# Patient Record
Sex: FEMALE | URBAN - METROPOLITAN AREA
[De-identification: names, ages, dates, MRNs, and addresses within clinical notes are randomized per-mention and may not be internally consistent; named-entity substitution may affect disease eponyms.]

---

## 2018-03-01 ENCOUNTER — APPOINTMENT (RX ONLY)
Dept: URBAN - METROPOLITAN AREA CLINIC 143 | Facility: CLINIC | Age: 58
Setting detail: DERMATOLOGY
End: 2018-03-01

## 2018-03-01 DIAGNOSIS — L29.8 OTHER PRURITUS: ICD-10-CM

## 2018-03-01 DIAGNOSIS — L259 CONTACT DERMATITIS AND OTHER ECZEMA, UNSPECIFIED CAUSE: ICD-10-CM

## 2018-03-01 DIAGNOSIS — L29.89 OTHER PRURITUS: ICD-10-CM

## 2018-03-01 PROBLEM — E78.5 HYPERLIPIDEMIA, UNSPECIFIED: Status: ACTIVE | Noted: 2018-03-01

## 2018-03-01 PROBLEM — L23.9 ALLERGIC CONTACT DERMATITIS, UNSPECIFIED CAUSE: Status: ACTIVE | Noted: 2018-03-01

## 2018-03-01 PROCEDURE — ? COUNSELING

## 2018-03-01 PROCEDURE — 99203 OFFICE O/P NEW LOW 30 MIN: CPT

## 2018-03-01 PROCEDURE — ? PRESCRIPTION

## 2018-03-01 RX ORDER — TRIAMCINOLONE ACETONIDE 1 MG/G
CREAM TOPICAL
Qty: 1 | Refills: 1 | Status: ERX | COMMUNITY
Start: 2018-03-01

## 2018-03-01 RX ADMIN — TRIAMCINOLONE ACETONIDE: 1 CREAM TOPICAL at 16:18

## 2018-03-01 ASSESSMENT — LOCATION SIMPLE DESCRIPTION DERM
LOCATION SIMPLE: ABDOMEN
LOCATION SIMPLE: CHEST

## 2018-03-01 ASSESSMENT — LOCATION DETAILED DESCRIPTION DERM
LOCATION DETAILED: PERIUMBILICAL SKIN
LOCATION DETAILED: LEFT MEDIAL SUPERIOR CHEST

## 2018-03-01 ASSESSMENT — LOCATION ZONE DERM: LOCATION ZONE: TRUNK

## 2018-03-01 NOTE — PROCEDURE: MIPS QUALITY
Quality 131: Pain Assessment And Follow-Up: Pain assessment using a standardized tool is documented as negative, no follow-up plan required
Quality 154 Part A: Falls: Risk Assessment (Should Be Reported With Measure 155.): Falls risk assessment completed and documented in the past 12 months.
Quality 47: Advance Care Plan: Advance Care Planning discussed and documented in the medical record; patient did not wish or was not able to name a surrogate decision maker or provide an advance care plan.
Quality 402: Tobacco Use And Help With Quitting Among Adolescents: Patient screened for tobacco and is an ex-smoker
Quality 154 Part B: Falls: Risk Screening (Should Be Reported With Measure 155.): Patient screened for future fall risk; documentation of no falls in the past year or only one fall without injury in the past year
Quality 431: Preventive Care And Screening: Unhealthy Alcohol Use - Screening: Patient screened for unhealthy alcohol use using a single question and scores 2 or greater episodes per year and brief intervention occurred
Quality 110: Preventive Care And Screening: Influenza Immunization: Influenza Immunization Administered during Influenza season
Quality 111:Pneumonia Vaccination Status For Older Adults: Pneumococcal Vaccination not Administered or Previously Received, Reason not Otherwise Specified
Quality 155 (Denominator): Falls Plan Of Care: Plan of Care not Documented, Reason not Otherwise Specified
Detail Level: Detailed

## 2018-03-08 ENCOUNTER — RX ONLY (OUTPATIENT)
Age: 58
Setting detail: RX ONLY
End: 2018-03-08

## 2018-03-08 RX ORDER — PIMECROLIMUS 10 MG/G
CREAM TOPICAL
Qty: 1 | Refills: 2 | Status: ERX | COMMUNITY
Start: 2018-03-08

## 2020-10-08 ENCOUNTER — OFFICE VISIT (OUTPATIENT)
Dept: URBAN - METROPOLITAN AREA CLINIC 54 | Facility: CLINIC | Age: 60
End: 2020-10-08
Payer: COMMERCIAL

## 2020-10-08 DIAGNOSIS — H43.811 VITREOUS DEGENERATION, RIGHT EYE: ICD-10-CM

## 2020-10-08 PROCEDURE — 92134 CPTRZ OPH DX IMG PST SGM RTA: CPT | Performed by: OPHTHALMOLOGY

## 2020-10-08 PROCEDURE — 99204 OFFICE O/P NEW MOD 45 MIN: CPT | Performed by: OPHTHALMOLOGY

## 2020-10-08 ASSESSMENT — INTRAOCULAR PRESSURE
OD: 17
OS: 19

## 2020-10-08 NOTE — IMPRESSION/PLAN
Impression: Age-related nuclear cataract, bilateral: H25.13 Bilateral. Plan: Discussed progression of cataract OD after PPV. Monitor for now.

## 2020-10-08 NOTE — IMPRESSION/PLAN
Impression: Puckering of macula, right eye: H35.371 Right. OCT: 10/8/20 OD: ERM
OS: wnl Plan: Examination and OCT reveals an ERM which is distorting the macular contour. The diagnosis, natural history, and prognosis of epiretinal membranes, as well as the risks and benefits of vitrectomy with membrane peeling versus observation were discussed at length. Given the patient's current visual acuity and hindrance on activities of daily living, surgical correction was recommended. The patient understands that while the distortion should sigrid, the final acuity, which can take months to achieve, may or may not be an improvement over baseline. 

PLAN: 25g PPV, ERM-ILM peel, IVK, poss gas OD x ERM

## 2020-10-08 NOTE — IMPRESSION/PLAN
Impression: Vitreous degeneration, right eye: H43.811 Right. Plan: Indirect ophthalmoscopy was performed and no retinal breaks or evidence of detachment were identified. The diagnosis, natural history, and prognosis of PVD were discussed at length. The signs and symptoms of retinal break/detachment including increased flashes, new-onset floaters, and development of a shadow/curtain shade in the visual field were reviewed.

## 2020-11-24 ENCOUNTER — Encounter (OUTPATIENT)
Dept: URBAN - METROPOLITAN AREA EXTERNAL CLINIC 14 | Facility: EXTERNAL CLINIC | Age: 60
End: 2020-11-24
Payer: COMMERCIAL

## 2020-11-24 PROCEDURE — 67042 VIT FOR MACULAR HOLE: CPT | Performed by: OPHTHALMOLOGY

## 2020-11-25 ENCOUNTER — POST-OPERATIVE VISIT (OUTPATIENT)
Dept: URBAN - METROPOLITAN AREA CLINIC 54 | Facility: CLINIC | Age: 60
End: 2020-11-25
Payer: COMMERCIAL

## 2020-11-25 DIAGNOSIS — Z48.810 ENCOUNTER FOR SURGICAL AFTERCARE FOLLOWING SURGERY ON THE SENSE ORGANS: ICD-10-CM

## 2020-11-25 PROCEDURE — 99024 POSTOP FOLLOW-UP VISIT: CPT | Performed by: OPHTHALMOLOGY

## 2020-11-25 ASSESSMENT — INTRAOCULAR PRESSURE
OD: 17
OS: 18

## 2020-11-25 NOTE — IMPRESSION/PLAN
Impression: S/P 
PLAN: 25g PPV, ERM-ILM peel, IVK, poss gas OD x ERM OD - 1 Day. Puckering of macula, right eye  H35.371. Excellent post op course   Post operative instructions reviewed - Condition is improving - Plan: No s/s of RD/infection VA/IOP acceptable Post-operative instructions and precautions Reviewed. Gas pos/prec. 
Call ASAP with changes
--Continue Ocuflox QID--Continue PF QID

1 week POS (NVP)

## 2020-12-03 ENCOUNTER — POST-OPERATIVE VISIT (OUTPATIENT)
Dept: URBAN - METROPOLITAN AREA CLINIC 54 | Facility: CLINIC | Age: 60
End: 2020-12-03
Payer: COMMERCIAL

## 2020-12-03 DIAGNOSIS — H35.371 PUCKERING OF MACULA, RIGHT EYE: Primary | ICD-10-CM

## 2020-12-03 PROCEDURE — 99024 POSTOP FOLLOW-UP VISIT: CPT | Performed by: OPHTHALMOLOGY

## 2020-12-03 ASSESSMENT — INTRAOCULAR PRESSURE
OS: 17
OD: 14

## 2020-12-31 ENCOUNTER — POST-OPERATIVE VISIT (OUTPATIENT)
Dept: URBAN - METROPOLITAN AREA CLINIC 54 | Facility: CLINIC | Age: 60
End: 2020-12-31
Payer: COMMERCIAL

## 2020-12-31 PROCEDURE — 99024 POSTOP FOLLOW-UP VISIT: CPT | Performed by: OPHTHALMOLOGY

## 2020-12-31 ASSESSMENT — INTRAOCULAR PRESSURE
OS: 18
OD: 14

## 2020-12-31 NOTE — IMPRESSION/PLAN
Impression: S/P  25g PPV, ERM-ILM peel, IVK, poss gas x ERM OD - 37 Days. Puckering of macula, right eye  H35.371. OCT:
OD: s/p ERM Peel OS: WNL Plan: Doing well. [id number] Jelly Macias for [employer].  

RTC 3 months DFE OU OCT OU

## 2021-03-04 ENCOUNTER — OFFICE VISIT (OUTPATIENT)
Dept: URBAN - METROPOLITAN AREA CLINIC 54 | Facility: CLINIC | Age: 61
End: 2021-03-04
Payer: COMMERCIAL

## 2021-03-04 DIAGNOSIS — H25.13 AGE-RELATED NUCLEAR CATARACT, BILATERAL: ICD-10-CM

## 2021-03-04 PROCEDURE — 92134 CPTRZ OPH DX IMG PST SGM RTA: CPT | Performed by: OPHTHALMOLOGY

## 2021-03-04 PROCEDURE — 99213 OFFICE O/P EST LOW 20 MIN: CPT | Performed by: OPHTHALMOLOGY

## 2021-03-04 ASSESSMENT — INTRAOCULAR PRESSURE
OD: 16
OS: 16

## 2021-03-04 NOTE — IMPRESSION/PLAN
Impression: Age-related nuclear cataract, bilateral: H25.13.  Plan: Continue follow up with Dr Cynthia Dangelo

## 2021-03-04 NOTE — IMPRESSION/PLAN
Impression: S/P  25g PPV, ERM-ILM peel, IVK, poss gas x ERM OD Puckering of macula, right eye  H35.371. OCT:
OD: s/p ERM Peel OS: WNL Plan: Doing well. Mei Teixeira for [facility].  

RTC PRN